# Patient Record
Sex: MALE | Race: WHITE | NOT HISPANIC OR LATINO | URBAN - METROPOLITAN AREA
[De-identification: names, ages, dates, MRNs, and addresses within clinical notes are randomized per-mention and may not be internally consistent; named-entity substitution may affect disease eponyms.]

---

## 2019-08-16 ENCOUNTER — OFFICE VISIT CONVERTED (OUTPATIENT)
Dept: FAMILY MEDICINE CLINIC | Facility: CLINIC | Age: 34
End: 2019-08-16
Attending: PHYSICIAN ASSISTANT

## 2020-07-28 ENCOUNTER — HOSPITAL ENCOUNTER (OUTPATIENT)
Dept: GENERAL RADIOLOGY | Facility: HOSPITAL | Age: 35
Discharge: HOME | End: 2020-07-28
Attending: PHYSICIAN ASSISTANT

## 2020-07-28 ENCOUNTER — HOSPITAL ENCOUNTER (OUTPATIENT)
Dept: LAB | Facility: HOSPITAL | Age: 35
Discharge: HOME | End: 2020-07-28
Attending: PHYSICIAN ASSISTANT

## 2020-07-28 ENCOUNTER — OFFICE VISIT CONVERTED (OUTPATIENT)
Dept: FAMILY MEDICINE CLINIC | Facility: CLINIC | Age: 35
End: 2020-07-28
Attending: PHYSICIAN ASSISTANT

## 2020-07-28 LAB
25(OH)D3 SERPL-MCNC: 62.1 NG/ML (ref 30–100)
ALBUMIN SERPL-MCNC: 4.2 G/DL (ref 3.5–5)
ALBUMIN/GLOB SERPL: 1.4 {RATIO} (ref 1.4–2.6)
ALP SERPL-CCNC: 54 U/L (ref 53–128)
ALT SERPL-CCNC: 24 U/L (ref 10–40)
ANION GAP SERPL CALC-SCNC: 15 MMOL/L (ref 8–19)
APPEARANCE UR: CLEAR
AST SERPL-CCNC: 26 U/L (ref 15–50)
BASOPHILS # BLD AUTO: 0.01 10*3/UL (ref 0–0.2)
BASOPHILS NFR BLD AUTO: 0.2 % (ref 0–3)
BILIRUB SERPL-MCNC: 0.73 MG/DL (ref 0.2–1.3)
BILIRUB UR QL: NEGATIVE
BUN SERPL-MCNC: 13 MG/DL (ref 5–25)
BUN/CREAT SERPL: 14 {RATIO} (ref 6–20)
CALCIUM SERPL-MCNC: 10 MG/DL (ref 8.7–10.4)
CHLORIDE SERPL-SCNC: 101 MMOL/L (ref 99–111)
CHOLEST SERPL-MCNC: 129 MG/DL (ref 107–200)
CHOLEST/HDLC SERPL: 2.5 {RATIO} (ref 3–6)
COLOR UR: YELLOW
CONV ABS IMM GRAN: 0.02 10*3/UL (ref 0–0.2)
CONV CO2: 30 MMOL/L (ref 22–32)
CONV COLLECTION SOURCE (UA): NORMAL
CONV IMMATURE GRAN: 0.3 % (ref 0–1.8)
CONV TOTAL PROTEIN: 7.1 G/DL (ref 6.3–8.2)
CONV UROBILINOGEN IN URINE BY AUTOMATED TEST STRIP: 0.2 {EHRLICHU}/DL (ref 0.1–1)
CREAT UR-MCNC: 0.91 MG/DL (ref 0.7–1.2)
DEPRECATED RDW RBC AUTO: 41.6 FL (ref 35.1–43.9)
EOSINOPHIL # BLD AUTO: 0.03 10*3/UL (ref 0–0.7)
EOSINOPHIL # BLD AUTO: 0.5 % (ref 0–7)
ERYTHROCYTE [DISTWIDTH] IN BLOOD BY AUTOMATED COUNT: 12.4 % (ref 11.6–14.4)
GFR SERPLBLD BASED ON 1.73 SQ M-ARVRAT: >60 ML/MIN/{1.73_M2}
GLOBULIN UR ELPH-MCNC: 2.9 G/DL (ref 2–3.5)
GLUCOSE SERPL-MCNC: 85 MG/DL (ref 70–99)
GLUCOSE UR QL: NEGATIVE MG/DL
HCT VFR BLD AUTO: 48.4 % (ref 42–52)
HDLC SERPL-MCNC: 52 MG/DL (ref 40–60)
HGB BLD-MCNC: 15.8 G/DL (ref 14–18)
HGB UR QL STRIP: NEGATIVE
KETONES UR QL STRIP: NEGATIVE MG/DL
LDLC SERPL CALC-MCNC: 67 MG/DL (ref 70–100)
LEUKOCYTE ESTERASE UR QL STRIP: NEGATIVE
LYMPHOCYTES # BLD AUTO: 1.03 10*3/UL (ref 1–5)
LYMPHOCYTES NFR BLD AUTO: 15.8 % (ref 20–45)
MCH RBC QN AUTO: 29.8 PG (ref 27–31)
MCHC RBC AUTO-ENTMCNC: 32.6 G/DL (ref 33–37)
MCV RBC AUTO: 91.3 FL (ref 80–96)
MONOCYTES # BLD AUTO: 0.3 10*3/UL (ref 0.2–1.2)
MONOCYTES NFR BLD AUTO: 4.6 % (ref 3–10)
NEUTROPHILS # BLD AUTO: 5.14 10*3/UL (ref 2–8)
NEUTROPHILS NFR BLD AUTO: 78.6 % (ref 30–85)
NITRITE UR QL STRIP: NEGATIVE
NRBC CBCN: 0 % (ref 0–0.7)
OSMOLALITY SERPL CALC.SUM OF ELEC: 293 MOSM/KG (ref 273–304)
PH UR STRIP.AUTO: 8 [PH] (ref 5–8)
PLATELET # BLD AUTO: 187 10*3/UL (ref 130–400)
PMV BLD AUTO: 9.4 FL (ref 9.4–12.4)
POTASSIUM SERPL-SCNC: 3.9 MMOL/L (ref 3.5–5.3)
PROT UR QL: NEGATIVE MG/DL
RBC # BLD AUTO: 5.3 10*6/UL (ref 4.7–6.1)
SODIUM SERPL-SCNC: 142 MMOL/L (ref 135–147)
SP GR UR: 1.02 (ref 1–1.03)
T4 FREE SERPL-MCNC: 1.1 NG/DL (ref 0.9–1.8)
TESTOST SERPL-MCNC: 576 NG/DL (ref 249–836)
TRIGL SERPL-MCNC: 48 MG/DL (ref 40–150)
TSH SERPL-ACNC: 2.49 M[IU]/L (ref 0.27–4.2)
VLDLC SERPL-MCNC: 10 MG/DL (ref 5–37)
WBC # BLD AUTO: 6.53 10*3/UL (ref 4.8–10.8)

## 2020-07-29 LAB
ASO AB SERPL-ACNC: 86 [IU]/ML (ref 0–200)
B BURGDOR IGG+IGM SER-ACNC: <0.91 ISR (ref 0–0.9)
CONV RHEUMATOID FACTOR IGM: 39.8 [IU]/ML (ref 0–14)
CRP SERPL-MCNC: 8.6 MG/L (ref 0–5)
DSDNA AB SER-ACNC: NEGATIVE [IU]/ML
ENA AB SER IA-ACNC: NEGATIVE {RATIO}
URATE SERPL-MCNC: 5.5 MG/DL (ref 3.5–8.5)

## 2020-07-30 LAB
R RICKETTSI IGG SER QL IA: NORMAL
R RICKETTSI IGG SER QL IA: POSITIVE
R RICKETTSI IGM TITR SER: 0.61 INDEX (ref 0–0.89)

## 2020-08-01 LAB — CCP IGA+IGG SERPL IA-ACNC: 6 UNITS (ref 0–19)

## 2020-08-20 ENCOUNTER — OFFICE VISIT CONVERTED (OUTPATIENT)
Dept: UROLOGY | Facility: CLINIC | Age: 35
End: 2020-08-20
Attending: UROLOGY

## 2020-08-20 ENCOUNTER — CONVERSION ENCOUNTER (OUTPATIENT)
Dept: SURGERY | Facility: CLINIC | Age: 35
End: 2020-08-20

## 2020-09-10 ENCOUNTER — OFFICE VISIT CONVERTED (OUTPATIENT)
Dept: FAMILY MEDICINE CLINIC | Facility: CLINIC | Age: 35
End: 2020-09-10
Attending: PHYSICIAN ASSISTANT

## 2020-09-10 ENCOUNTER — CONVERSION ENCOUNTER (OUTPATIENT)
Dept: FAMILY MEDICINE CLINIC | Facility: CLINIC | Age: 35
End: 2020-09-10

## 2020-09-24 ENCOUNTER — PROCEDURE VISIT CONVERTED (OUTPATIENT)
Dept: UROLOGY | Facility: CLINIC | Age: 35
End: 2020-09-24
Attending: UROLOGY

## 2020-12-03 ENCOUNTER — OFFICE VISIT CONVERTED (OUTPATIENT)
Dept: UROLOGY | Facility: CLINIC | Age: 35
End: 2020-12-03
Attending: UROLOGY

## 2021-05-10 NOTE — H&P
History and Physical      Patient Name: Terence Vera   Patient ID: [id number]   Sex: Male   YOB: 1985    Primary Care Provider: Abisai Griggs PA-C   Referring Provider: Abisai Griggs PA-C    Visit Date: August 20, 2020    Provider: Shaye Goncalves MD   Location: [facility]   Location Address: [address] [place] [address]  [id number]   Location Phone: [phone]          Chief Complaint  · The patient is here for a vasectomy consultation.      History Of Present Illness  Terence Vera is here for counseling regarding vasectomy for permanent sterilization.   The procedure was discussed with the patient. Terence Vera is aware that the procedure should be considered irreversible, although at times it can be reversed with success. Risks for the procedure including local effects of selling, bleeding, pain, the possibility for recanalization, and continued fertility is also possible. Formation of a sperm granuloma also is a possibility. We discussed the procedure, preoperative preparation, and postoperative care. We also discussed the importance of continuing to use contraception post vasectomy, since the patient will not be sterile at that point. We stressed the importance of continuing to use contraception until a follow-up semen specimen is done that shows the absence of sperm. That specimen will normally be obtained eight weeks post-surgery.   Terence Vera is voluntarily requesting this procedure and understands that if it is successful he will be unable to father children. Has 3 children   He has no cardiopulmonary history, no prior scrotal surgery, no blood thinners.       Past Medical History  *No Pertinent Past Medical History         Past Surgical History  *No Past Surgical History         Medication List  diclofenac sodium 75 mg oral tablet,delayed release (DR/EC); Fish Oil 500 mg oral capsule; Glucosamine 500 mg oral tablet; multivitamin oral tablet; Vitamin C  "500 mg oral tablet         Allergy List  NO KNOWN DRUG ALLERGIES       Allergies Reconciled  Family Medical History  Brain Neoplasm, Benign; Hypertension; Hypothyroidism; Spine Problems; Heart Attack (MI); Kidney Cancer         Social History  Alcohol (Never); Heavy Amount of Exercise (4 or more times weekly); ; No known infection risk; Tobacco (Never)         Review of Systems  · Constitutional  o Denies  o : fever, chills  · Eyes  o Denies  o : double vision, cataracts  · HENT  o Denies  o : headaches, hearing loss  · Cardiovascular  o Denies  o : chest pain, irregular heart beats, dyspnea on exertion, chest pain on exertion  · Respiratory  o Denies  o : shortness of breath, wheezing, cough  · Gastrointestinal  o Denies  o : nausea, vomiting, diarrhea, heartburn  · Genitourinary  o Denies  o : scrotal mass, penile discharge, scrotal abnormalities  · Integument  o Denies  o : rash, itching, skin lesion or lump  · Endocrine  o Denies  o : weight gain, weight loss  · Psychiatric  o Denies  o : anxiety, depression, difficulty sleeping      Vitals  Date Time BP Position Site L\R Cuff Size HR RR TEMP (F) WT  HT  BMI kg/m2 BSA m2 O2 Sat        08/20/2020 10:03 AM       12  183lbs 0oz 5'  11\" 25.52 2.04           Physical Examination  · Constitutional  o Appearance  o : well developed, well-nourished, in no acute distress.   · Neck  o Inspection/Palpation  o : normal appearance, no masses or tenderness, trachea midline  · Respiratory  o Respiratory Effort  o : breathing unlabored  o Auscultation of Lungs  o : clear to ascultation bilaterally  · Cardiovascular  o Heart  o :   § Auscultation of Heart  § : regular rate and rhythm, no murmurs  · Gastrointestinal  o Abdominal Examination  o : nontender, nondistended, no rigidity or gaurding,no hepatosplenomegaly  · Genitourinary  o Bladder  o : nonpalpable  o Penis  o : normal circumcised phallus with no masses or lesions  o Urethral Meatus  o : no inflammation " present and no stenosis  o Scrotum and Scrotal Contents  o :   § Scrotum  § : bilateral vas were palpable  § Testes  § : descended testicles no masses or lesions  · Lymphatic  o Neck  o : no lymphadenopathy present, normal size  o Axilla  o : no lymphadenopathy present, normal size  o Groin  o : no lymphadenopathy present, normal size  · Neurologic  o Mental Status Examination  o :   § Orientation  § : grossly oriented to person, place and time, judgement and insight intact, normal mood and affect              Assessment  · Consultation for sterilization     V25.09/Z30.09    Problems Reconciled  Plan  · Medications  o Medications have been Reconciled  o Transition of Care or Provider Policy  · Instructions  o The patient will schedule a vasectomy if he desires.  o Handouts were provided, vasectomy  scanned into the EMR for reference.   o Vasectomy is intended to be a permanent form of contraception.  o Vasectomy does not produce immediate sterility.  o Following vasectomy, another form of contraception is required until vas occlusion is confirmed by post-vasectomy semen analysis (PVSA).  o Even after vas occlusion is confirmed, vasectomy is not 100% reliable in preventing pregnancy.  o The risk of pregnancy after vasectomy is approximately 1 in 2,000 for men who have no sperm in the semen on post-vasectomy semen analysis showing rare non-motile sperm (RNMS).  o Repeat vasectomy is necessary in < 1% of vasectomies, provided that a technique for vas occlusion known to have low occlusive failure rate has been used.   o Patient's should refrain from ejaculation for approximately 1 week after vasectomy.  o Options for fertility after vasectomy reversal and sperm retrival with in vitro fertilization. These options are not always successful, and are very expensive.   o The rates of surgical complications such as symptomatic hematoma and infection are 1-2%.  o Chronic scrotal pain associated with negative impact on  quality of life occurs after vasectomy in about 1-2% of men. Few of these men require addtional surgery.   o Other permanent and non-permanent alternatives to vasectomy are avaliable.  o Patient voiced understanding of the above statements.  o Electronically Identified Patient Education Materials Provided Electronically     Valium prescription provided  Aware he must have  present at time of vasectomy  All questions addressed             Electronically Signed by: Shaye Goncalves MD -Author on August 20, 2020 10:47:39 AM

## 2021-05-10 NOTE — PROCEDURES
Procedure Note      Patient Name: Terence Vera   Patient ID: [id number]   Sex: Male   YOB: 1985    Primary Care Provider: Abisai Griggs PA-C   Referring Provider: Abisai Griggs PA-C    Visit Date: September 24, 2020    Provider: Shaye Goncalves MD   Location: [facility]   Location Address: [address] [place] [address]  [id number]   Location Phone: [phone]          Vasectomy Procedure  Procedure: Bilateral Vasectomy   Pre-procedure Diagnosis: Undesired Fertility   Post-procedure Diagnosis: Same   Surgeon: Shaye Leung MD   Anesthesia: Local, 10 cc of 1% Lidocaine   Indications Terence Vera with undesired fertility, who presents today for bilateral vasectomy for permanent contraception.   Procedure Details:   The patient was appropriately identified, brought into the procedure room, and placed supine on the procedure table. A time was undertaken documenting the correct patient, site and procedure. His scrotum was prepped and draped in the standard sterile fashion. We first approached the right vas deferens. This was identified,  from the spermatic cord structures, and brought to the anterolateral aspect of the hemiscrotum. The local anesthetic solution was injected and once an adequate level of local anesthesia was achieved, a scalpel was used to make a 1 cm incision in the skin overlying the vas deferens. A sharp hemostat was used to dissect the level of the vas deferens and on both of its sides, allowing for ring forceps to be introduced and grasp the vas deferens and externalize it through the skin incision. We then used a combination of sharp and blunt dissection to release the exposed vasal segment from all surrounding tissues. An approximately 1 cm piece of vas deferens was then sharply excised and removed. One blade of a sharp hemostat was used to probe each end of the severed vas deferens, confirming the presence of a vasal lumen.  Electocautery was then used to fulgurate both cut surfaces of the severed vas deferens. The abdominal side of the vas deferens was then placed underneath some perivasal tissues that were closed above it, using a figure-of-eight 3-0 chromic stitch, thus placing the two edges of the severed vas deferens in different tissue planes. Hemostasis was confirmed and the severed vas deferens was allowed to drop back into the scrotum. The skin was closed with a single 3-0 Chromic stitch. We then turned our attention to the left vas deferens. This was also identified and brought to the anterolateral aspect of the ipsilateral hemiscrotum. The local anesthetic was then delivered on this side, and again, the scalpel was used to make an approximately 1 cm inicision in the skin overlying the vas deferens. An identical procedure was then carried out on the vas deferens. Both wounds were dressed with bacitracin ointment, folded 4x4 gauze, and bulky fluffs. The patient tolerated the procedure well and there were no intraoperative or immediate postoperative complications.           Assessment  · Sterilization     V25.2    Problems Reconciled  Plan  · Orders  o Vasectomy (64020) - V25.2 - 09/24/2020  · Medications  o Medications have been Reconciled  o Transition of Care or Provider Policy  · Instructions  o Wound Care discussed.  o Patient reminded to continue another method for contraception, until he has had a post-vasectomy semen analysis that is negative for sperm.. Patient voiced understanding.   o First post-void analysis in 10 weeks.  o Electronically Identified Patient Education Materials Provided Electronically            Electronically Signed by: Shaye Goncalves MD -Author on September 24, 2020 09:37:40 AM

## 2021-05-13 NOTE — PROGRESS NOTES
Progress Note      Patient Name: Terence Vera   Patient ID: [id number]   Sex: Male   YOB: 1985    Primary Care Provider: Abisai Griggs PA-C   Referring Provider: Abisai Griggs PA-C    Visit Date: July 28, 2020    Provider: Abisai Griggs PA-C   Location: [facility]   Location Address: [address] [place] [address], [address]  [place], [place]  [id number]   Location Phone: [phone]          Chief Complaint  · routine labs  · night sweats  · lower back pain      History Of Present Illness  Terence Vera is a 34 year old /White male who presents for evaluation and treatment of: night sweats.      pt presents today for night sweats. pt stated for the last 4 months he has been having them at least once a week.    pt stated he has been having lower back pain for the last year, has been going to a chiropractor for the last couple months.    pt would like to have his routine labs done.    Pt has been seeing chiro past two mos.  He states that the pain is constant - Pain is always there.    Night sweats began 4 mos ago out of the blue.  Once a week, he takes IBU and it helps.  No itching.  No wt los or unexplained bruising.    No cough, no fever, some chills  Patient states he has had back pain off and on for the past year but it is progressively worse and now it is constant every single days and pain certain movements make it worse.  He then began to develop night sweats since May.  He states about once a week he wakes up drenched in sweat he states he takes Motrin feels better.  He denies any testicular pain or discomfort no abnormalities but he does wish to have a consult with urology for vasectomy.  He denies any weight loss no fever no nausea vomiting diarrhea constipation.  No headache.  No rashes.  But he does admit to having a tick bite sometime this year which he removed.       Past Medical History  Disease Name Date Onset Notes   *No Pertinent Past Medical History --  --          Past  "Surgical History  Procedure Name Date Notes   *No Past Surgical History --  --          Medication List  Name Date Started Instructions   Fish Oil 500 mg oral capsule  take 1 capsule by oral route daily   Glucosamine 500 mg oral tablet  take 1 tablet by oral route daily   multivitamin oral tablet  take 1 tablet by oral route daily   Vitamin C 500 mg oral tablet  take 1 tablet by oral route daily         Allergy List  Allergen Name Date Reaction Notes   NO KNOWN DRUG ALLERGIES --  --  --          Family Medical History  Disease Name Relative/Age Notes   Brain Neoplasm, Benign Mother/   --    Hypertension Father/   --    Hypothyroidism Brother/   --    Spine Problems Brother/   --    Heart Attack (MI) Grandfather (maternal)/   --    Kidney Cancer Grandmother (paternal)/   --          Social History  Finding Status Start/Stop Quantity Notes   Alcohol Never --/-- --  --    Heavy Amount of Exercise (4 or more times weekly) --  --/-- --  --     --  --/-- --  --    No known infection risk --  --/-- --  --    Tobacco Never --/-- --  --          Immunizations  NameDate Admin Mfg Trade Name Lot Number Route Inj VIS Given VIS Publication   Tdap07/13/2018 NE Not Entered  NE NE 07/13/2018    Comments:          Review of Systems  · Constitutional  o Denies  o : fever, fatigue, weight loss, weight gain  · Cardiovascular  o Denies  o : lower extremity edema, claudication, chest pressure, palpitations  · Respiratory  o Denies  o : shortness of breath, wheezing, cough, hemoptysis, dyspnea on exertion  · Gastrointestinal  o Denies  o : nausea, vomiting, diarrhea, constipation, abdominal pain      Vitals  Date Time BP Position Site L\R Cuff Size HR RR TEMP (F) WT  HT  BMI kg/m2 BSA m2 O2 Sat        07/28/2020 08:46 /75 Sitting    76 - R   183lbs 2oz 5'  11\" 25.54 2.04 98 %          Physical Examination  · Constitutional  o Appearance  o : well developed, well-nourished, no acute distress  · Head and Face  o Head  o : " normocephalic, atraumatic  · Ears, Nose, Mouth and Throat  o Ears  o :   § External Ears  § : external auditory canal appearance normal, no discharge present  § Otoscopic Examination  § : tympanic membranes pearly white/gray bilaterally  o Nose  o :   § External Nose  § : no lesions noted  § Nasopharynx  § : no discharge present  o Oral Cavity  o :   § Oral Mucosa  § : oral mucosa light pink  o Throat  o :   § Oropharynx  § : tonsils without exudate, no palatal petechiae  · Neck  o Inspection/Palpation  o : normal appearance, no masses or tenderness, trachea midline  o Thyroid  o : gland size normal, nontender, no nodules or masses present on palpation  · Respiratory  o Respiratory Effort  o : breathing unlabored  o Inspection of Chest  o : chest rise symmetric bilaterally  o Auscultation of Lungs  o : clear to auscultation bilaterally throughout inspiration and expiration  · Cardiovascular  o Heart  o :   § Auscultation of Heart  § : regular rate and rhythm, no murmurs, gallops or rubs  o Peripheral Vascular System  o :   § Extremities  § : no edema  · Gastrointestinal  o Abdominal Examination  o : abdomen nontender to palpation, tone normal without rigidity or guarding, no masses present, abdominal contour scaphoid  o Liver and spleen  o : no hepatomegaly present, liver nontender to palpation, spleen not palpable  o Hernias  o : no hernias present  · Lymphatic  o Neck  o : no cervical lymphadenopathy, no supraclavicular lymphadenopathy  · Psychiatric  o Mood and Affect  o : mood normal, affect appropriate     BACK - palp tend along the right SI joint into the right hip, neg SLR           Assessment  · Annual physical exam     V70.0/Z00.00  · Lumbago     724.2/M54.5  · Polyarthralgia     719.49/M25.50  · Screening for depression     V79.0/Z13.89  · Vasectomy evaluation     V25.09/Z30.09  · Night sweats     780.8/R61  · Tick bite       Bitten or stung by nonvenomous insect and other nonvenomous arthropods, initial  encounter     919.4/W57.XXXA      Plan  · Orders  o Lumbar Spine Complete [facility] (81710) - 724.2/M54.5 - 07/28/2020  o ACO-18: Negative screen for clinical depression using a standardized tool () - V79.0/Z13.89 - 07/28/2020  o Free T4 (95702) - V70.0/Z00.00 - 07/28/2020  o Physical, Primary Care Panel (CBC, CMP, Lipid, TSH) [facility] (70575, 54653, 23157, 51218) - V70.0/Z00.00 - 07/28/2020  o Urinalysis with Reflex Microscopy if abnormal ([facility]) (27407) - V70.0/Z00.00 - 07/28/2020  o Vitamin D (25-Hydroxy) Level (48300) - V70.0/Z00.00 - 07/28/2020  o ACO-39: Current medications updated and reviewed () - - 07/28/2020  o Testosterone (Total) (15430) - - 07/28/2020  o RA Profile 1 (Uric Acid, ASO, RF IgM, ZAHEER, CRP) [facility] (47139, 69738, 94584, 46776, 65040) - - 07/28/2020  o Lyme Antibody Screen (IgG/IgM) (22657) - - 07/28/2020  o [place] Spotted Fever IgM [facility] ([id number]) - - 07/28/2020  o [place] Spotted Fever IgG [facility] (01743) - - 07/28/2020  o UROLOGY CONSULTATION (UROLO) - V25.09/Z30.09 - 07/28/2020  · Medications  o diclofenac sodium 75 mg oral tablet,delayed release (DR/EC)   SIG: take 1 tablet (75 mg) by oral route 2 times per day for 30 days   DISP: (60) tablets with 0 refills  Prescribed on 07/28/2020     o Medications have been Reconciled  o Transition of Care or Provider Policy  · Instructions  o Reviewed health maintenance flowsheet and updated information. Orders were placed and/or patient's response was documented.  o Depression Screen completed and scanned into the EMR under the designated folder within the patient's documents.  o Today's PHQ-9 result is _4__  o Rest. Increase Fluids.  o Patient was educated/instructed on their diagnosis, treatment and medications prior to discharge from the clinic today.  o Discussed Covid-19 precautions including, but not limited to, social distancing, avoid touching your face, and hand washing.   · Disposition  o Call or Return if symptoms worsen or  persist.  o F/U in clinic in 1 month.  o Care Transition            Electronically Signed by: Abisai Griggs PA-C -Author on July 28, 2020 09:42:50 AM

## 2021-05-13 NOTE — PROGRESS NOTES
Quick Note      Patient Name: Terence Vera   Patient ID: [id number]   Sex: Male   YOB: 1985    Primary Care Provider: Abisai Griggs PA-C   Referring Provider: Abisai Griggs PA-C    Visit Date: December 3, 2020    Provider: Shaye Goncalves MD   Location: [facility]   Location Address: [address] [place] [address]  [id number]   Location Phone: [phone]          History Of Present Illness  Terence Vera is a 35 year old /White male who is here status post vasectomy. 0 sperm noted on a #20 power field. Denies scrotal complaints or concerns.           Assessment  · Postoperative Visit-status post vasectomy     V67.00    Problems Reconciled  Plan  · Orders  o IOP - Semen Analysis (81843, 57941) - V67.00 - 12/03/2020  · Medications  o Medications have been Reconciled  o Transition of Care or Provider Policy  · Instructions  o Instructed patient to follow-up prn.  o Electronically Identified Patient Education Materials Provided Electronically            Electronically Signed by: Shaye Goncalves MD -Author on December 3, 2020 10:18:46 AM

## 2021-05-13 NOTE — PROGRESS NOTES
Progress Note      Patient Name: Terence Vera   Patient ID: [id number]   Sex: Male   YOB: 1985    Primary Care Provider: Abisai Griggs PA-C   Referring Provider: Abisai Griggs PA-C    Visit Date: September 10, 2020    Provider: Abisai Griggs PA-C   Location: [facility]   Location Address: [address] [place] [address], [address]  [place], [place]  [id number]   Location Phone: [phone]          Chief Complaint  · 6 week follow up      History Of Present Illness  Teernce Vera is a 34 year old /White male who presents for evaluation and treatment of:      Patient is here for a 6 week follow up. He has no concerns to address this visit. He says that the physical therapy is helping his back. He also finished his antibiotic a month ago for the Bobby Mtn. Spotted Fever.    No night sweats, joint aches.     PT has helped    Vasectomy in two weeks  He will take valium prior to procedure    He had a neg CCP and pos RA - he will eventually see Dr. Curz - Rheum, he wants to wait.    He is doing much better.  He feels like PT and NSAIDs has helped tremendously.  No joint pains or swelling         Past Medical History  Disease Name Date Onset Notes   *No Pertinent Past Medical History --  --          Past Surgical History  Procedure Name Date Notes   *No Past Surgical History --  --          Medication List  Name Date Started Instructions   Fish Oil 500 mg oral capsule  take 1 capsule by oral route daily   Glucosamine 500 mg oral tablet  take 1 tablet by oral route daily   multivitamin oral tablet  take 1 tablet by oral route daily   Valium 10 mg oral tablet  take 1 tablet (10 mg) by oral route when instructed at your vasectomy appt.   Vitamin C 500 mg oral tablet  take 1 tablet by oral route daily         Allergy List  Allergen Name Date Reaction Notes   NO KNOWN DRUG ALLERGIES --  --  --          Family Medical History  Disease Name Relative/Age Notes   Brain Neoplasm, Benign  "Mother/   --    Hypertension Father/   --    Hypothyroidism Brother/   --    Spine Problems Brother/   --    Heart Attack (MI) Grandfather (maternal)/   --    Kidney Cancer Grandmother (paternal)/   --          Social History  Finding Status Start/Stop Quantity Notes   Alcohol Never --/-- --  --    Heavy Amount of Exercise (4 or more times weekly) --  --/-- --  --     --  --/-- --  --    No known infection risk --  --/-- --  --    Tobacco Never --/-- --  --          Immunizations  NameDate Admin Mfg Trade Name Lot Number Route Inj VIS Given VIS Publication   Tdap07/13/2018 NE Not Entered  NE NE 07/13/2018    Comments:          Review of Systems  · Constitutional  o Denies  o : fever, fatigue, weight loss, weight gain  · Cardiovascular  o Denies  o : lower extremity edema, claudication, chest pressure, palpitations  · Respiratory  o Denies  o : shortness of breath, wheezing, cough, hemoptysis, dyspnea on exertion  · Gastrointestinal  o Denies  o : nausea, vomiting, diarrhea, constipation, abdominal pain      Vitals  Date Time BP Position Site L\R Cuff Size HR RR TEMP (F) WT  HT  BMI kg/m2 BSA m2 O2 Sat HC       09/10/2020 09:45 /85 Sitting    66 - R   191lbs 4oz 5'  11\" 26.67 2.08 100 %          Physical Examination  · Constitutional  o Appearance  o : well developed, well-nourished, no acute distress  · Head and Face  o Head  o : normocephalic, atraumatic  · Neck  o Inspection/Palpation  o : normal appearance, no masses or tenderness, trachea midline  o Thyroid  o : gland size normal, nontender, no nodules or masses present on palpation  · Respiratory  o Respiratory Effort  o : breathing unlabored  o Inspection of Chest  o : chest rise symmetric bilaterally  o Auscultation of Lungs  o : clear to auscultation bilaterally throughout inspiration and expiration  · Cardiovascular  o Heart  o :   § Auscultation of Heart  § : regular rate and rhythm, no murmurs, gallops or rubs  o Peripheral Vascular " System  o :   § Extremities  § : no edema  · Lymphatic  o Neck  o : no cervical lymphadenopathy, no supraclavicular lymphadenopathy  · Psychiatric  o Mood and Affect  o : mood normal, affect appropriate     BACK - palp tend along the lumbar spine               Assessment  · Lumbago     724.2/M54.5  · Back pain     724.5/M54.9      Plan  · Orders  o ACO-39: Current medications updated and reviewed () - - 09/10/2020  · Medications  o diclofenac sodium 75 mg oral tablet,delayed release (DR/EC)   SIG: take 1 tablet (75 mg) by oral route 2 times per day for 30 days   DISP: (60) tablets with 1 refills  Prescribed on 09/10/2020     o Medications have been Reconciled  o Transition of Care or Provider Policy  · Instructions  o Take all medications as prescribed/directed.  o Patient instructed/educated on their diet and exercise program.  o Patient was educated/instructed on their diagnosis, treatment and medications prior to discharge from the clinic today.  o Discussed Covid-19 precautions including, but not limited to, social distancing, avoid touching your face, and hand washing.   · Disposition  o Call or Return if symptoms worsen or persist.  o F/U in 1 year            Electronically Signed by: Abisai Griggs PA-C -Author on September 10, 2020 08:45:40 PM

## 2021-05-14 VITALS
WEIGHT: 191.25 LBS | DIASTOLIC BLOOD PRESSURE: 85 MMHG | OXYGEN SATURATION: 100 % | BODY MASS INDEX: 26.77 KG/M2 | HEART RATE: 66 BPM | HEIGHT: 71 IN | SYSTOLIC BLOOD PRESSURE: 131 MMHG

## 2021-05-15 VITALS
HEIGHT: 71 IN | DIASTOLIC BLOOD PRESSURE: 76 MMHG | HEART RATE: 69 BPM | WEIGHT: 183.25 LBS | TEMPERATURE: 98.3 F | SYSTOLIC BLOOD PRESSURE: 124 MMHG | BODY MASS INDEX: 25.65 KG/M2 | OXYGEN SATURATION: 98 %

## 2021-05-15 VITALS — HEIGHT: 71 IN | WEIGHT: 183 LBS | BODY MASS INDEX: 25.62 KG/M2 | RESPIRATION RATE: 12 BRPM

## 2021-05-15 VITALS
BODY MASS INDEX: 25.64 KG/M2 | OXYGEN SATURATION: 98 % | SYSTOLIC BLOOD PRESSURE: 123 MMHG | HEIGHT: 71 IN | DIASTOLIC BLOOD PRESSURE: 75 MMHG | WEIGHT: 183.12 LBS | HEART RATE: 76 BPM

## 2024-05-16 PROCEDURE — 88305 TISSUE EXAM BY PATHOLOGIST: CPT | Performed by: OPHTHALMOLOGY

## 2024-05-20 ENCOUNTER — LAB REQUISITION (OUTPATIENT)
Dept: LAB | Facility: HOSPITAL | Age: 39
End: 2024-05-20

## 2024-05-20 DIAGNOSIS — I78.1 NEVUS, NON-NEOPLASTIC: ICD-10-CM

## 2024-05-23 LAB
CYTO UR: NORMAL
LAB AP CASE REPORT: NORMAL
LAB AP CLINICAL INFORMATION: NORMAL
PATH REPORT.FINAL DX SPEC: NORMAL
PATH REPORT.GROSS SPEC: NORMAL

## 2025-08-12 ENCOUNTER — TRANSCRIBE ORDERS (OUTPATIENT)
Dept: ADMINISTRATIVE | Facility: HOSPITAL | Age: 40
End: 2025-08-12
Payer: COMMERCIAL

## 2025-08-12 DIAGNOSIS — Z02.89 ENCOUNTER FOR FIREFIGHTER HEALTH EXAMINATION: Primary | ICD-10-CM
